# Patient Record
(demographics unavailable — no encounter records)

---

## 2025-03-11 NOTE — ASSESSMENT
[FreeTextEntry1] : This is a very pleasant 58M who presents with symptomatic b/l inguinal hernias.   Risks and benefits of open vs. robotic inguinal hernias with mesh were discussed and the patient would like to proceed with robotic repair.  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 He will get preop clearance from PCP / PST within a month prior to the surgery.

## 2025-03-11 NOTE — PHYSICAL EXAM
[No Rash or Lesion] : No rash or lesion [Alert] : alert [Oriented to Person] : oriented to person [Oriented to Place] : oriented to place [Oriented to Time] : oriented to time [Calm] : calm [de-identified] : Nonlabored breathing [de-identified] : No acute distress [de-identified] : soft, non-tender, non-distended, b/l inguinal hernias difficult to appreciate

## 2025-03-11 NOTE — HISTORY OF PRESENT ILLNESS
[de-identified] : This is a very pleasant 58M who presents with symptomatic b/l inguinal hernias. He states that over the past year he has noted some b/l groin discomfort which happens every single time he does abdominal exercises. He saw his PCP who ended up ordering an US which diagnose two small b/l inguinal hernias. He denied any obstructive symptoms from these hernias. The discomfort he has usually resolves after rest in a few hours. No persistent excruciating pain, nausea, vomiting or visible bulge noted. He otherwise is quite healthy.